# Patient Record
Sex: MALE | Race: WHITE | NOT HISPANIC OR LATINO | Employment: OTHER | URBAN - METROPOLITAN AREA
[De-identification: names, ages, dates, MRNs, and addresses within clinical notes are randomized per-mention and may not be internally consistent; named-entity substitution may affect disease eponyms.]

---

## 2017-01-06 ENCOUNTER — APPOINTMENT (OUTPATIENT)
Dept: GENERAL RADIOLOGY | Facility: HOSPITAL | Age: 82
End: 2017-01-06

## 2017-01-06 ENCOUNTER — HOSPITAL ENCOUNTER (EMERGENCY)
Facility: HOSPITAL | Age: 82
Discharge: HOME | End: 2017-01-06
Attending: EMERGENCY MEDICINE | Admitting: EMERGENCY MEDICINE

## 2017-01-06 VITALS
SYSTOLIC BLOOD PRESSURE: 156 MMHG | TEMPERATURE: 97.5 F | RESPIRATION RATE: 20 BRPM | BODY MASS INDEX: 32.51 KG/M2 | DIASTOLIC BLOOD PRESSURE: 96 MMHG | OXYGEN SATURATION: 93 % | HEIGHT: 72 IN | HEART RATE: 67 BPM | WEIGHT: 240 LBS

## 2017-01-06 DIAGNOSIS — I71.20 THORACIC AORTIC ANEURYSM WITHOUT RUPTURE (HCC): Primary | ICD-10-CM

## 2017-01-06 LAB
ABO GROUP BLD: NORMAL
ALBUMIN SERPL-MCNC: 4.3 G/DL (ref 3.2–4.8)
ALBUMIN/GLOB SERPL: 1.7 G/DL (ref 1.5–2.5)
ALP SERPL-CCNC: 62 U/L (ref 25–100)
ALT SERPL W P-5'-P-CCNC: 10 U/L (ref 7–40)
ANION GAP SERPL CALCULATED.3IONS-SCNC: 11 MMOL/L (ref 3–11)
AST SERPL-CCNC: 15 U/L (ref 0–33)
BASOPHILS # BLD AUTO: 0.04 10*3/MM3 (ref 0–0.2)
BASOPHILS NFR BLD AUTO: 0.4 % (ref 0–1)
BILIRUB SERPL-MCNC: 0.6 MG/DL (ref 0.3–1.2)
BLD GP AB SCN SERPL QL: NEGATIVE
BUN BLD-MCNC: 16 MG/DL (ref 9–23)
BUN/CREAT SERPL: 14.5 (ref 7–25)
CALCIUM SPEC-SCNC: 9.6 MG/DL (ref 8.7–10.4)
CHLORIDE SERPL-SCNC: 100 MMOL/L (ref 99–109)
CO2 SERPL-SCNC: 30 MMOL/L (ref 20–31)
CREAT BLD-MCNC: 1.1 MG/DL (ref 0.6–1.3)
DEPRECATED RDW RBC AUTO: 46.3 FL (ref 37–54)
EOSINOPHIL # BLD AUTO: 0.14 10*3/MM3 (ref 0.1–0.3)
EOSINOPHIL NFR BLD AUTO: 1.5 % (ref 0–3)
ERYTHROCYTE [DISTWIDTH] IN BLOOD BY AUTOMATED COUNT: 13.8 % (ref 11.3–14.5)
GFR SERPL CREATININE-BSD FRML MDRD: 64 ML/MIN/1.73
GLOBULIN UR ELPH-MCNC: 2.6 GM/DL
GLUCOSE BLD-MCNC: 93 MG/DL (ref 70–100)
HCT VFR BLD AUTO: 42.3 % (ref 38.9–50.9)
HGB BLD-MCNC: 14.3 G/DL (ref 13.1–17.5)
HOLD SPECIMEN: NORMAL
HOLD SPECIMEN: NORMAL
IMM GRANULOCYTES # BLD: 0.02 10*3/MM3 (ref 0–0.03)
IMM GRANULOCYTES NFR BLD: 0.2 % (ref 0–0.6)
INR PPP: 1.04
LYMPHOCYTES # BLD AUTO: 1.34 10*3/MM3 (ref 0.6–4.8)
LYMPHOCYTES NFR BLD AUTO: 14.7 % (ref 24–44)
MCH RBC QN AUTO: 31 PG (ref 27–31)
MCHC RBC AUTO-ENTMCNC: 33.8 G/DL (ref 32–36)
MCV RBC AUTO: 91.6 FL (ref 80–99)
MONOCYTES # BLD AUTO: 0.99 10*3/MM3 (ref 0–1)
MONOCYTES NFR BLD AUTO: 10.9 % (ref 0–12)
NEUTROPHILS # BLD AUTO: 6.59 10*3/MM3 (ref 1.5–8.3)
NEUTROPHILS NFR BLD AUTO: 72.3 % (ref 41–71)
PLATELET # BLD AUTO: 227 10*3/MM3 (ref 150–450)
PMV BLD AUTO: 10.8 FL (ref 6–12)
POTASSIUM BLD-SCNC: 3.6 MMOL/L (ref 3.5–5.5)
PROT SERPL-MCNC: 6.9 G/DL (ref 5.7–8.2)
PROTHROMBIN TIME: 11.4 SECONDS (ref 9.6–11.5)
RBC # BLD AUTO: 4.62 10*6/MM3 (ref 4.2–5.76)
RH BLD: NEGATIVE
SODIUM BLD-SCNC: 141 MMOL/L (ref 132–146)
WBC NRBC COR # BLD: 9.12 10*3/MM3 (ref 3.5–10.8)
WHOLE BLOOD HOLD SPECIMEN: NORMAL
WHOLE BLOOD HOLD SPECIMEN: NORMAL

## 2017-01-06 PROCEDURE — 80053 COMPREHEN METABOLIC PANEL: CPT | Performed by: PHYSICIAN ASSISTANT

## 2017-01-06 PROCEDURE — 71010 HC CHEST PA OR AP: CPT

## 2017-01-06 PROCEDURE — 93005 ELECTROCARDIOGRAM TRACING: CPT | Performed by: PHYSICIAN ASSISTANT

## 2017-01-06 PROCEDURE — 99284 EMERGENCY DEPT VISIT MOD MDM: CPT | Performed by: THORACIC SURGERY (CARDIOTHORACIC VASCULAR SURGERY)

## 2017-01-06 PROCEDURE — 85610 PROTHROMBIN TIME: CPT | Performed by: PHYSICIAN ASSISTANT

## 2017-01-06 PROCEDURE — 86850 RBC ANTIBODY SCREEN: CPT

## 2017-01-06 PROCEDURE — 86901 BLOOD TYPING SEROLOGIC RH(D): CPT

## 2017-01-06 PROCEDURE — 85025 COMPLETE CBC W/AUTO DIFF WBC: CPT | Performed by: PHYSICIAN ASSISTANT

## 2017-01-06 PROCEDURE — 99284 EMERGENCY DEPT VISIT MOD MDM: CPT

## 2017-01-06 PROCEDURE — 86900 BLOOD TYPING SEROLOGIC ABO: CPT

## 2017-01-06 RX ORDER — ASPIRIN 81 MG/1
TABLET, CHEWABLE ORAL DAILY
COMMUNITY
Start: 2014-02-03

## 2017-01-06 RX ORDER — LEVOTHYROXINE SODIUM 0.07 MG/1
TABLET ORAL
COMMUNITY
Start: 2014-02-03

## 2017-01-06 RX ORDER — VALSARTAN AND HYDROCHLOROTHIAZIDE 160; 25 MG/1; MG/1
TABLET ORAL
COMMUNITY
Start: 2015-09-15

## 2017-01-06 RX ORDER — METFORMIN HYDROCHLORIDE 750 MG/1
TABLET, EXTENDED RELEASE ORAL DAILY
COMMUNITY
Start: 2014-02-17

## 2017-01-06 RX ORDER — SODIUM CHLORIDE 0.9 % (FLUSH) 0.9 %
10 SYRINGE (ML) INJECTION AS NEEDED
Status: DISCONTINUED | OUTPATIENT
Start: 2017-01-06 | End: 2017-01-06 | Stop reason: HOSPADM

## 2017-01-06 NOTE — CONSULTS
Cardiothoracic Surgery History & Physical           Chief complaint: Hoarseness    HPI: 83 year old  male with a history of hypertension, hyperlipidemia, diabetes, lung cancer and an ascending aortic aneurysm status post repair presents with hoarseness.  The patient believes this started approximately 4 months ago with no resolution.  He was evaluated by Dr. Benson of ENT and a CT of the chest was performed that revealed a descending aortic aneurysm.  Mr Zuniga was immediately referred to the emergency department for further evaluation.  He denies chest pain, back pain, abdominal pain, lower extremity pain or shortness of breath.        Past Medical History   Diagnosis Date   • Hyperlipidemia    • Hypertension    • Lung cancer    • Prostate cancer      Past Surgical History   Procedure Laterality Date   • Cardiac surgery     • Lung lobectomy       PARTIAL RIGHT UPPER     History reviewed. No pertinent family history.  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    Lives in [place] with his wife.    Allergies:  Review of patient's allergies indicates no known allergies.    Review of Systems:    A comprehensive review of systems was negative except for:     HEENT:Hoarseness    All other systems were reviewed and are negative.    Vital Signs:  Temp:  [97.5 °F (36.4 °C)] 97.5 °F (36.4 °C)  Heart Rate:  [64-67] 67  Resp:  [20] 20  BP: ()/(52-96) 156/96    Physical Exam:  Physical Exam   Constitutional: He is oriented to person, place, and time. He appears well-developed and well-nourished. No distress.   HENT:   Head: Normocephalic and atraumatic.   Eyes: Conjunctivae are normal. No scleral icterus.   Neck: Neck supple. No JVD present. No tracheal deviation present.   No carotid bruits bilaterally   Cardiovascular: Normal rate, regular rhythm and  normal heart sounds.  Exam reveals no gallop and no friction rub.    No murmur heard.  Pulses:       Femoral pulses are 2+ on the right side, and 2+ on the left side.  Pulmonary/Chest: Effort normal and breath sounds normal. No respiratory distress. He has no wheezes. He has no rales.   Abdominal: Soft. He exhibits no distension and no mass. There is no tenderness. There is no rebound and no guarding.   Musculoskeletal: Normal range of motion. He exhibits edema.   Trace pedal edema bilaterally   Neurological: He is alert and oriented to person, place, and time.   Skin: Skin is warm and dry. No rash noted. He is not diaphoretic. No erythema.   Psychiatric: He has a normal mood and affect. His behavior is normal. Judgment and thought content normal.       Labs:    Results from last 7 days  Lab Units 01/06/17  1557   WBC 10*3/mm3 9.12   HEMOGLOBIN g/dL 14.3   HEMATOCRIT % 42.3   PLATELETS 10*3/mm3 227       Results from last 7 days  Lab Units 01/06/17  1557   SODIUM mmol/L 141   POTASSIUM mmol/L 3.6   CHLORIDE mmol/L 100   TOTAL CO2 mmol/L 30.0   BUN mg/dL 16   CREATININE mg/dL 1.10   GLUCOSE mg/dL 93   CALCIUM mg/dL 9.6       Imaging:  CT of the neck and chest performed 1/6/17, personally reviewed, demonstrates a 7 cm descending thoracic aortic aneurysm.  The aortic arch measures 6.3 cm.  The ascending aortic aneurysm measures 4.7 cm.  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  There is thrombus within the ascending aorta extending down to the descending aorta as a result of the aneurysm.  There is a 7.9 cm subcarinal mass.  This is intimately associated with the lesser curvature of the aortic arch.  There is no lung mass.        Assessment: 83 year old  male with a history of hypertension, hyperlipidemia, diabetes, lung cancer and an ascending aortic aneurysm status post repair presents with hoarseness secondary to recurrent laryngeal nerve paralysis.  The etiology of the paralysis may be the  large aortic aneurysm versus this subcarinal mass.        Plan: I suspect this descending aortic aneurysm has been present for some time and the dissection may be old and related to his previous repair as it is strictly located in the right subclavian artery.  The subcarinal mass is more concerning to me and may represent a new malignancy.  The details of his previous lung cancer are limited and I will need to obtain his records for review.  This mass is amenable to EBUS for a tissue diagnosis with the intent for possible radiation or chemotherapy.  I discussed his descending aortic aneurysm and offered blood pressure control versus endovascular stenting.  We discussed the risks with surgery including paralysis and death and he deferred surgery.  He understood that without repair, this aneurysm would continue to enlarge and rupture with certain death.  I recommended tight blood pressure control and to check his blood pressure at home at least on a daily basis.          Tobi Patel MD  01/06/17  5:39 PM

## 2017-01-06 NOTE — ED PROVIDER NOTES
Subjective   HPI Comments: Dr. Pacheco Pelayo ordered a CT of the neck and chest to evaluate the patient's hoarseness.  She was sent here to the emergency department after Dr. Carnes's office called him and told him to come immediately here.  Patient was not really aware of why he was sent to the emergency department.  I called Dr. Carnes's office they told me that the patient had a large thoracic aneurysm.  I obtained the CD from the patient which was done over the [facility].  This directly to Dr. Chaka Villalobos who advised me that the patient had a type a thoracic aneurysm that was 6.5 cm and looked as if it was dissecting.  I contacted Dr. Patel cardiothoracic surgeon on-call who was actually in the OR.  He sent his physician assistant down and took the CD with them to evaluate the CD itself.  Family then advise me that Dr. Ospina had seen the patient greater than 10 years ago and done some type of a wrap on his thoracic aneurysm.  4:09 PM Dr. Patel and Dr. Martinez currently looking at the images     Patient is a 83 y.o. male presenting with sore throat.   History provided by:  Patient   used: No    Sore Throat   Sore throat location: hoarsness for month getting worse.   Quality: pt no real pain just lost voice.   Onset quality:  Gradual  Duration:  2 months  Timing:  Constant  Progression:  Worsening  Chronicity:  New  Associated symptoms: no abdominal pain, no chest pain, no chills, no epistaxis, no fever, no headaches and no shortness of breath        Review of Systems   Constitutional: Negative for chills and fever.   HENT: Positive for sore throat. Negative for congestion and nosebleeds.         Patient has had hoarseness which is been going on for over a month.   Eyes: Negative for photophobia and pain.   Respiratory: Negative for shortness of breath.    Cardiovascular: Negative for chest pain.   Gastrointestinal: Negative for abdominal pain, diarrhea, nausea and vomiting.    Endocrine: Negative for polyuria.   Genitourinary: Negative for difficulty urinating, dysuria, flank pain, frequency, hematuria and urgency.   Neurological: Negative for dizziness, tremors, seizures, syncope, facial asymmetry, speech difficulty, weakness, light-headedness, numbness and headaches.   Psychiatric/Behavioral: Negative.    All other systems reviewed and are negative.      Past Medical History   Diagnosis Date   • Hyperlipidemia    • Hypertension    • Lung cancer    • Prostate cancer        No Known Allergies    Past Surgical History   Procedure Laterality Date   • Cardiac surgery     • Lung lobectomy       PARTIAL RIGHT UPPER       History reviewed. No pertinent family history.    Social History     Social History   • Marital status:      Spouse name: N/A   • Number of children: N/A   • Years of education: N/A     Social History Main Topics   • Smoking status: Former Smoker   • Smokeless tobacco: None   • Alcohol use No   • Drug use: No   • Sexual activity: Not Asked     Other Topics Concern   • None     Social History Narrative   • None           Objective   Physical Exam   Constitutional:   Patient is worse warm pink and dry and nontoxic       Procedures         ED Course  ED Course                  MDM  Number of Diagnoses or Management Options  Thoracic aortic aneurysm without rupture: new and requires workup  Diagnosis management comments: Patient was seen and evaluated by Dr. Patel.  Dr. Patel offered the patient surgery.  Politely refused surgical procedure.  He's elected to treat blood pressure.  Dr. Patel suggested giving him some metoprolol and follow-up with his family doctor.  Patient understands the risks of not treating this and  Risks  as well as the risks of treating it with surgery.  Realizes that from the disability or death is a risk with both options.       Amount and/or Complexity of Data Reviewed  Clinical lab tests: reviewed and ordered  Tests in the radiology section  of CPT®: reviewed and ordered  Tests in the medicine section of CPT®: ordered and reviewed  Discuss the patient with other providers: yes    Patient Progress  Patient progress: stable      Final diagnoses:   Thoracic aortic aneurysm without rupture            ALONDRA Pierson  01/06/17 1945

## 2017-01-09 DIAGNOSIS — R91.8 LUNG MASS: Primary | ICD-10-CM
